# Patient Record
Sex: FEMALE | Race: WHITE
[De-identification: names, ages, dates, MRNs, and addresses within clinical notes are randomized per-mention and may not be internally consistent; named-entity substitution may affect disease eponyms.]

---

## 2017-03-01 ENCOUNTER — HOSPITAL ENCOUNTER (EMERGENCY)
Dept: HOSPITAL 17 - PHEFT | Age: 22
Discharge: HOME | End: 2017-03-01
Payer: COMMERCIAL

## 2017-03-01 VITALS
RESPIRATION RATE: 16 BRPM | SYSTOLIC BLOOD PRESSURE: 123 MMHG | OXYGEN SATURATION: 95 % | DIASTOLIC BLOOD PRESSURE: 93 MMHG | TEMPERATURE: 99.2 F | HEART RATE: 102 BPM

## 2017-03-01 VITALS — BODY MASS INDEX: 20.57 KG/M2 | HEIGHT: 69 IN | WEIGHT: 138.89 LBS

## 2017-03-01 DIAGNOSIS — J01.90: Primary | ICD-10-CM

## 2017-03-01 DIAGNOSIS — R50.9: ICD-10-CM

## 2017-03-01 DIAGNOSIS — J20.9: ICD-10-CM

## 2017-03-01 PROCEDURE — 99283 EMERGENCY DEPT VISIT LOW MDM: CPT

## 2017-03-01 PROCEDURE — 71010: CPT

## 2017-03-01 NOTE — PD
HPI


.


Cough and congestion for 1 week


Chief Complaint:  ENT Complaint


Time Seen by Provider:  15:02


Travel History


International Travel<30 days:  No


Contact w/Intl Traveler<30days:  No


Traveled to known affect area:  No





History of Present Illness


HPI


21-year-old female with no significant past history here with complaints of 

cough and congestion for 1 week.  Patient said she had a fever 101 yesterday 

and became concerned.  She says her cough is not subsiding actually seems to be 

getting worse.  She also admits to some facial pressure and congestion.  She 

tells me her boyfriend was recently sick.  At the time of examination patient 

denies any active fever, chills, chest pain, shortness of breath, nausea, 

vomiting, diarrhea or abdominal pain





PFSH


Past Medical History


Diminished Hearing:  No


Immunizations Current:  Yes


Pregnant?:  Not Pregnant


LMP:  17


:  0





Social History


Alcohol Use:  No


Tobacco Use:  No


Substance Use:  No





Allergies-Medications


(Allergen,Severity, Reaction):  


Coded Allergies:  


     No Known Allergies (Unverified , 3/1/17)


Reported Meds & Prescriptions





Reported Meds & Active Scripts


Active


Sprintec 28 (Norgestimate-Ethinyl Estradiol) 0.25-35 mg-Mcg Tab 1 Tab PO DAILY








Review of Systems


General / Constitutional:  Positive: Fever


Eyes:  No: Visual changes


HENT:  Positive: Congestion,  No: Headaches


Cardiovascular:  No: Chest Pain or Discomfort


Respiratory:  Positive: Cough,  No: Shortness of Breath


Gastrointestinal:  No: Abdominal Pain


Genitourinary:  No: Dysuria


Musculoskeletal:  No: Pain


Skin:  No Rash


Neurologic:  No: Weakness


Psychiatric:  No: Depression


Endocrine:  No: Polydipsia


Hematologic/Lymphatic:  No: Easy Bruising





Physical Exam


Narrative


GENERAL: AAO x 3, no acute distress, Well-nourished, well-developed patient.


SKIN: Warm and dry. No visible rashes or bruising. 


HEAD: Normocephalic and atraumatic.


EYES: No scleral icterus. No injection or drainage. EOM intact, PERRLA


ENT: No nasal drainage noted. Mucous membranes pink. Airway patent.  Moderate 

postnasal drip.  Maxillary and frontal sinus tenderness.  No posterior pharynx 

erythema.


NECK: Supple, trachea midline. No JVD.  No lymphadenopathy


CARDIOVASCULAR: Regular rate and rhythm without murmurs, gallops, or rubs. 


RESPIRATORY: Breath sounds equal bilaterally.  There are some scattered rhonchi 

greater on the right than left


GASTROINTESTINAL: Abdomen soft, non-tender, nondistended. 


EXTREMITIES: No cyanosis or edema. 


BACK: Nontender without obvious deformity. No CVA tenderness.


PSYCH: AAO x 3, normal affect.





Data


Data


Last Documented VS





Vital Signs








  Date Time  Temp Pulse Resp B/P Pulse Ox O2 Delivery O2 Flow Rate FiO2


 


3/1/17 14:52   20     


 


3/1/17 14:32 99.2 102  123/93 95   








Orders





 Chest, Single Ap (3/1/17 15:04)


Ed Urine Pregnancytest Poc (3/1/17 15:04)








MDM


Medical Decision Making


Medical Screen Exam Complete:  Yes


Emergency Medical Condition:  Yes


Differential Diagnosis


Acute sinusitis, acute bronchitis, pneumonia


Narrative Course


21-year-old female with no significant past history here with complaints of 

cough and congestion for 1 week.  Patient said she had a fever 101 yesterday 

and became concerned.  She says her cough is not subsiding actually seems to be 

getting worse.  She also admits to some facial pressure and congestion.  She 

tells me her boyfriend was recently sick.  At the time of examination patient 

denies any active fever, chills, chest pain, shortness of breath, nausea, 

vomiting, diarrhea or abdominal pain.





Patient seen and examined.


Recommend chest x-ray.


Chest x-ray with no acute disease.


Discussed with patient.


I will treat for sinusitis and acute bronchitis








Patient verbalized understanding of instructions, questions were answered, and 

thanked me for their care. I advised them if their condition worsens, please 

return to the nearest emergency room for further care.





Diagnosis





 Primary Impression:  


 Acute sinus infection


 Qualified Code:  J01.90 - Acute sinusitis, recurrence not specified, 

unspecified location


 Additional Impression:  


 Acute bronchitis


 Qualified Code:  J20.9 - Acute bronchitis, unspecified organism


Patient Instructions:  Acute Bronchitis (ED), General Instructions, Sinusitis (

ED)





***Additional Instructions:


Please return to emergency department if your symptoms return or worsen. 


Follow up with your primary care provider. 


Take medications as prescribed.





Antibiotics can cause reduced efficacy of oral contraceptives.  Please use a 

backup method of birth control.


***Med/Other Pt SpecificInfo:  Prescription(s) given


Scripts


Albuterol 8.5 GM Inh (Proair Hfa 8.5 GM Inh)90 Mcg/Act Aer2 Puff INH Q6H PRN (

SHORTNESS OF BREATH) #1 INHALER  Ref 0


   108 mcg/actuation


   Prov:Carina Littlejohn DO         3/1/17 


Benzonatate (Tessalon Perles)100 Mg Ptf345 Mg PO TID PRN (COUGH) #15 CAP  Ref 0


   Prov:Carina Littlejohn DO         3/1/17 


Methylprednisolone Dosepak (Medrol Dosepak)4 Mg Dspk4 Mg PO AS DIRECTED  #1 

DSPK  Ref 0


   Per Pharmacist direction


   Prov:Craina Littlejohn DO         3/1/17 


Amoxicillin-Clavulanate (Augmentin)875-125 mg Tah952 Mg PO BID  #20 TAB


   not for use in CrCl <30 ml/min.


   Prov:LittlejohnCarina SILVERIO         3/1/17


Disposition:  01 DISCHARGE HOME


Condition:  Stable








Kathya Richardson Mar 1, 2017 15:03

## 2017-03-01 NOTE — RADHPO
EXAM DATE/TIME:  03/01/2017 15:20 

 

[facility] COMPARISON:     

No previous studies available for comparison.

 

                     

INDICATIONS :     

Cough, fever, chest congestion for 1 week

                     

 

MEDICAL HISTORY :     

None.          

 

SURGICAL HISTORY :     

None.   

 

ENCOUNTER:     

Initial                                        

 

ACUITY:     

1 week      

 

PAIN SCORE:     

0/10

 

LOCATION:     

Bilateral chest 

 

FINDINGS:     

A single view of the chest demonstrates the lungs to be symmetrically aerated without evidence of mas
s, infiltrate or effusion.  The cardiomediastinal contours are unremarkable.  Osseous structures are 
intact.

 

CONCLUSION:     No acute disease.  

 

 

 

 Ryan Woods MD on March 01, 2017 at 15:35           

Board Certified Radiologist.

 This report was verified electronically.

## 2017-06-01 ENCOUNTER — HOSPITAL ENCOUNTER (EMERGENCY)
Dept: HOSPITAL 17 - PHEFT | Age: 22
Discharge: HOME | End: 2017-06-01
Payer: COMMERCIAL

## 2017-06-01 VITALS — WEIGHT: 155.87 LBS | BODY MASS INDEX: 23.09 KG/M2 | HEIGHT: 69 IN

## 2017-06-01 VITALS
SYSTOLIC BLOOD PRESSURE: 120 MMHG | HEART RATE: 107 BPM | RESPIRATION RATE: 15 BRPM | OXYGEN SATURATION: 98 % | TEMPERATURE: 98.6 F | DIASTOLIC BLOOD PRESSURE: 90 MMHG

## 2017-06-01 DIAGNOSIS — L03.012: Primary | ICD-10-CM

## 2017-06-01 PROCEDURE — 10060 I&D ABSCESS SIMPLE/SINGLE: CPT

## 2017-06-01 NOTE — PD
HPI


Chief Complaint:  Skin Problem


Time Seen by Provider:  16:05


Travel History


International Travel<30 days:  No


Contact w/Intl Traveler<30days:  No


Traveled to known affect area:  No





History of Present Illness


HPI


21-year-old female presents to the emergency room for evaluation of left thumb 

pain and swelling for the past 2 days.  She denies trauma or injury.  Patient 

believes it is infected.  She has not done anything or taken anything for her 

symptoms.  She reports mild pain that is greatest over the radial side.  She 

denies fever, chills, nausea, vomiting, and streaking.  No chronic medical 

conditions or any medications.





PFSH


Past Medical History


Diminished Hearing:  No


Immunizations Current:  Yes


Pregnant?:  Not Pregnant


:  0





Social History


Alcohol Use:  No


Tobacco Use:  No


Substance Use:  No





Allergies-Medications


(Allergen,Severity, Reaction):  


Coded Allergies:  


     No Known Allergies (Unverified , 17)


Reported Meds & Prescriptions





Reported Meds & Active Scripts


Active


Bactrim DS (Sulfamethoxazole-Trimethoprim) 800-160 Mg Tab 1 Tab PO BID








Review of Systems


Except as stated in HPI:  all other systems reviewed are Neg





Physical Exam


Narrative


GENERAL: Well-nourished, well-developed female in no acute distress.  Afebrile.

  Ambulatory.


SKIN: Focused skin assessment warm/dry.  There is very mild edema and erythema 

of the perionychium of the left thumb.


HEAD: Normocephalic.


EYES: No scleral icterus. No injection or drainage. 


NECK: Supple, trachea midline. No JVD or lymphadenopathy.


CARDIOVASCULAR: Regular rate and rhythm without murmurs, gallops, or rubs. 


RESPIRATORY: Breath sounds equal bilaterally. No accessory muscle use.


PSYCHIATRIC: No delusional thought processes. No hallucinations.





Data


Data


Last Documented VS





Vital Signs








  Date Time  Temp Pulse Resp B/P Pulse Ox O2 Delivery O2 Flow Rate FiO2


 


17 15:38 98.6 107 15 120/90 98   








Orders





 Bupivacaine Pf 0.5% Inj (Marcaine Pf 0.5 (17 16:15)


Lidocaine 1% Inj (50 Ml) (Xylocaine 1% I (17 16:15)








MDM


Medical Decision Making


Medical Screen Exam Complete:  Yes


Emergency Medical Condition:  Yes


Medical Record Reviewed:  Yes


Differential Diagnosis


Paronychia versus abscess versus cellulitis


Narrative Course


21-year-old female presents to the emergency room for evaluation of left thumb 

swelling and redness for the past 2 days.  Denies trauma or injury.  Patient is 

afebrile and well-appearing in the emergency room.  Vital signs stable.  

Resting comfortably in bed.  Physical exam reveals a left thumb paronychia.  It 

is very mild.  Incision and drainage performed, see procedure note details.  

Patient discharged with Bactrim and told to follow-up with a primary return for 

worsening symptoms.  She understands and agrees to plan.





Procedures


**Procedure Narrative**


INCISION AND DRAINAGE OF ABSCESS: The area was prepped and was sterilely 

draped.  Digital block was performed using 1% lidocaine and 0.5% bupivacaine 

with a total number 4 mL was used to anesthetize the area properly.  A number 

11 scalpel was used to make a 1 cm incision across the area of the abscess.  

The abscess was drained, complex loculations were broken down, and irrigated 

with normal saline.  Sterile dressing applied.





Diagnosis





 Primary Impression:  


 Paronychia of left thumb


Referrals:  


Primary Care Physician


Patient Instructions:  General Instructions, Paronychia (ED)





***Additional Instructions:


Rest and drink plenty of fluids.


Take Bactrim as directed, until gone.


Follow up with a primary care physician.


Return to emergency room for worsening symptoms, as discussed.


***Med/Other Pt SpecificInfo:  Prescription(s) given


Scripts


Sulfamethoxazole-Trimethoprim (Bactrim DS)800-160 Mg Tab1 Tab PO BID  #14 TAB  

Ref 0


   Prov:Carina Littlejohn DO         17


Disposition:  01 DISCHARGE HOME


Condition:  Stable








Silvia Louis 2017 16:09

## 2018-03-10 ENCOUNTER — HOSPITAL ENCOUNTER (EMERGENCY)
Dept: HOSPITAL 17 - PHED | Age: 23
Discharge: HOME | End: 2018-03-10
Payer: SELF-PAY

## 2018-03-10 VITALS
RESPIRATION RATE: 18 BRPM | SYSTOLIC BLOOD PRESSURE: 155 MMHG | DIASTOLIC BLOOD PRESSURE: 95 MMHG | TEMPERATURE: 98.4 F | HEART RATE: 100 BPM | OXYGEN SATURATION: 100 %

## 2018-03-10 VITALS — BODY MASS INDEX: 21.71 KG/M2 | HEIGHT: 70 IN | WEIGHT: 151.68 LBS

## 2018-03-10 DIAGNOSIS — Q76.6: Primary | ICD-10-CM

## 2018-03-10 DIAGNOSIS — R10.11: ICD-10-CM

## 2018-03-10 DIAGNOSIS — R11.0: ICD-10-CM

## 2018-03-10 LAB
COLOR UR: (no result)
GLUCOSE UR STRIP-MCNC: (no result) MG/DL
HGB UR QL STRIP: (no result)
KETONES UR STRIP-MCNC: (no result) MG/DL
NITRITE UR QL STRIP: (no result)
RBC #/AREA URNS HPF: (no result) /HPF (ref 0–3)
SP GR UR STRIP: (no result) (ref 1–1.03)
SQUAMOUS #/AREA URNS HPF: (no result) /HPF (ref 0–5)
URINE LEUKOCYTE ESTERASE: (no result)

## 2018-03-10 PROCEDURE — 84703 CHORIONIC GONADOTROPIN ASSAY: CPT

## 2018-03-10 PROCEDURE — 99283 EMERGENCY DEPT VISIT LOW MDM: CPT

## 2018-03-10 PROCEDURE — 81001 URINALYSIS AUTO W/SCOPE: CPT

## 2018-03-10 NOTE — PD
HPI


Chief Complaint:  Abdominal Pain


Time Seen by Provider:  18:25


Travel History


International Travel<30 days:  No


Contact w/Intl Traveler<30days:  No


Traveled to known affect area:  No





History of Present Illness


HPI


22-year-old female complains of 3 days of right upper quadrant pain.  Onset 

gradual.  Timing constant.  Severity moderate.  Associated symptoms include 

nausea.  Patient unaware of modifying factors.  No fever.  No urinary 

complaint.  No abnormal vaginal bleeding or discharge.





PFSH


Past Medical History


Diminished Hearing:  No


Immunizations Current:  Yes


Pregnant?:  Not Pregnant


LMP:  1-2 WEEKS


:  0





Social History


Alcohol Use:  No


Tobacco Use:  No


Substance Use:  No





Allergies-Medications


(Allergen,Severity, Reaction):  


Coded Allergies:  


     No Known Allergies (Unverified  Adverse Reaction, Unknown, 3/10/18)


Reported Meds & Prescriptions





Reported Meds & Active Scripts


Active


Ibuprofen 600 Mg Tab 600 Mg PO Q8HR PRN


Sprintec 28 (Norgestimate-Ethinyl Estradiol) 0.25-35 mg-Mcg Tab 1 Tab PO DAILY








Review of Systems


Except as stated in HPI:  all other systems reviewed are Neg





Physical Exam


Narrative


GENERAL: 22-year-old female pleasant well-nourished well-developed





Vital Signs








  Date Time  Temp Pulse Resp B/P (MAP) Pulse Ox O2 Delivery O2 Flow Rate FiO2


 


3/10/18 16:48 98.4 100 18 155/95 (115) 100   








SKIN: Warm and dry.


HEAD: Atraumatic. Normocephalic. 


EYES: Pupils equal and round. No scleral icterus. No injection or drainage. 


ENT: No nasal bleeding or discharge.  Mucous membranes pink and moist.


NECK: Trachea midline. No JVD. 


CARDIOVASCULAR: Regular rate and rhythm.  


RESPIRATORY: No accessory muscle use. Clear to auscultation. Breath sounds 

equal bilaterally. 


GASTROINTESTINAL: In the region of the right upper quadrant of the abdomen 

there is a floating rib.  The contralateral side has a symmetric floating rib.  

Abdomen is otherwise soft.  The floating ribs are not particularly tender.  

There is no tenderness beneath the floating rib on either side. 


MUSCULOSKELETAL: Extremities without clubbing, cyanosis, or edema. No obvious 

deformities. 


NEUROLOGICAL: Awake and alert. No obvious cranial nerve deficits.  Motor 

grossly within normal limits. Five out of 5 muscle strength in the arms and 

legs.  Normal speech.


PSYCHIATRIC: Appropriate mood and affect; insight and judgment normal.





Data


Data


Last Documented VS





Vital Signs








  Date Time  Temp Pulse Resp B/P (MAP) Pulse Ox O2 Delivery O2 Flow Rate FiO2


 


3/10/18 16:48 98.4 100 18 155/95 (115) 100   








Orders





 Orders


Urinalysis - C+S If Indicated (3/10/18 16:50)


Ed Urine Pregnancytest Poc (3/10/18 16:50)


Ibuprofen (Motrin) (3/10/18 18:30)


Ed Discharge Order (3/10/18 18:30)





Labs





Laboratory Tests








Test


  3/10/18


16:58


 


Urine Collection Type CLEAN CATCH 


 


Urine Color OTHER 


 


Urine Turbidity CLEAR 


 


Urine pH 6.0 


 


Urine Specific Gravity


  LESS/EQUAL


1.005


 


Urine Protein NEG mg/dL 


 


Urine Glucose (UA) NEG mg/dL 


 


Urine Ketones NEG mg/dL 


 


Urine Occult Blood NEG 


 


Urine Nitrite NEG 


 


Urine Bilirubin NEG 


 


Urine Urobilinogen 0.2 MG/DL 


 


Urine Leukocyte Esterase NEG 


 


Urine RBC 0-3 /hpf 


 


Urine Squamous Epithelial


Cells 0-5 /hpf 


 


 


Microscopic Urinalysis Comment


  CULT NOT


INDICATED











MDM


Medical Decision Making


Medical Screen Exam Complete:  Yes


Emergency Medical Condition:  Yes


Medical Record Reviewed:  Yes


Differential Diagnosis


Gallbladder disease, hernia, floating rib


Narrative Course


patient has a floating rib potentially with some costochondritis.  Motrin as 

needed.  Return precautions discussed.





Diagnosis





 Primary Impression:  


 Finding of floating rib


Referrals:  


Primary Care Physician


2 days


***Med/Other Pt SpecificInfo:  Prescription(s) given


Scripts


Ibuprofen (Ibuprofen) 600 Mg Tab


600 MG PO Q8HR Y for PAIN SCALE 6 TO 10, #15 TAB 0 Refills


   Prov: Anthony Hernandez MD         3/10/18


Disposition:  01 DISCHARGE HOME


Condition:  Stable











Anthony Hernandez MD Mar 10, 2018 18:35